# Patient Record
Sex: FEMALE | Race: OTHER | NOT HISPANIC OR LATINO | URBAN - METROPOLITAN AREA
[De-identification: names, ages, dates, MRNs, and addresses within clinical notes are randomized per-mention and may not be internally consistent; named-entity substitution may affect disease eponyms.]

---

## 2018-06-01 ENCOUNTER — EMERGENCY (EMERGENCY)
Facility: HOSPITAL | Age: 48
LOS: 1 days | Discharge: HOME | End: 2018-06-01
Attending: EMERGENCY MEDICINE
Payer: COMMERCIAL

## 2018-06-01 VITALS
SYSTOLIC BLOOD PRESSURE: 91 MMHG | OXYGEN SATURATION: 99 % | RESPIRATION RATE: 18 BRPM | HEART RATE: 68 BPM | DIASTOLIC BLOOD PRESSURE: 58 MMHG | TEMPERATURE: 99 F

## 2018-06-01 VITALS
WEIGHT: 108.03 LBS | DIASTOLIC BLOOD PRESSURE: 64 MMHG | SYSTOLIC BLOOD PRESSURE: 93 MMHG | RESPIRATION RATE: 16 BRPM | HEART RATE: 94 BPM | OXYGEN SATURATION: 100 % | TEMPERATURE: 100 F

## 2018-06-01 LAB
ALBUMIN SERPL ELPH-MCNC: 4.4 G/DL — SIGNIFICANT CHANGE UP (ref 3.3–5)
ALP SERPL-CCNC: 53 U/L — SIGNIFICANT CHANGE UP (ref 40–120)
ALT FLD-CCNC: 22 U/L — SIGNIFICANT CHANGE UP (ref 10–45)
ANION GAP SERPL CALC-SCNC: 11 MMOL/L — SIGNIFICANT CHANGE UP (ref 5–17)
ANION GAP SERPL CALC-SCNC: 14 MMOL/L — SIGNIFICANT CHANGE UP (ref 5–17)
APPEARANCE UR: ABNORMAL
AST SERPL-CCNC: 14 U/L — SIGNIFICANT CHANGE UP (ref 10–40)
BILIRUB SERPL-MCNC: 0.5 MG/DL — SIGNIFICANT CHANGE UP (ref 0.2–1.2)
BILIRUB UR-MCNC: NEGATIVE — SIGNIFICANT CHANGE UP
BUN SERPL-MCNC: 10 MG/DL — SIGNIFICANT CHANGE UP (ref 7–23)
BUN SERPL-MCNC: 8 MG/DL — SIGNIFICANT CHANGE UP (ref 7–23)
CALCIUM SERPL-MCNC: 8 MG/DL — LOW (ref 8.4–10.5)
CALCIUM SERPL-MCNC: 9.2 MG/DL — SIGNIFICANT CHANGE UP (ref 8.4–10.5)
CHLORIDE SERPL-SCNC: 91 MMOL/L — LOW (ref 96–108)
CHLORIDE SERPL-SCNC: 99 MMOL/L — SIGNIFICANT CHANGE UP (ref 96–108)
CO2 SERPL-SCNC: 21 MMOL/L — LOW (ref 22–31)
CO2 SERPL-SCNC: 23 MMOL/L — SIGNIFICANT CHANGE UP (ref 22–31)
COLOR SPEC: YELLOW — SIGNIFICANT CHANGE UP
CREAT SERPL-MCNC: 0.73 MG/DL — SIGNIFICANT CHANGE UP (ref 0.5–1.3)
CREAT SERPL-MCNC: 0.78 MG/DL — SIGNIFICANT CHANGE UP (ref 0.5–1.3)
DIFF PNL FLD: ABNORMAL
GAS PNL BLDV: SIGNIFICANT CHANGE UP
GLUCOSE SERPL-MCNC: 109 MG/DL — HIGH (ref 70–99)
GLUCOSE SERPL-MCNC: 138 MG/DL — HIGH (ref 70–99)
GLUCOSE UR QL: NEGATIVE — SIGNIFICANT CHANGE UP
HCT VFR BLD CALC: 38 % — SIGNIFICANT CHANGE UP (ref 34.5–45)
HGB BLD-MCNC: 13.1 G/DL — SIGNIFICANT CHANGE UP (ref 11.5–15.5)
KETONES UR-MCNC: ABNORMAL
LEUKOCYTE ESTERASE UR-ACNC: ABNORMAL
MCHC RBC-ENTMCNC: 31.4 PG — SIGNIFICANT CHANGE UP (ref 27–34)
MCHC RBC-ENTMCNC: 34.4 GM/DL — SIGNIFICANT CHANGE UP (ref 32–36)
MCV RBC AUTO: 91.2 FL — SIGNIFICANT CHANGE UP (ref 80–100)
NITRITE UR-MCNC: NEGATIVE — SIGNIFICANT CHANGE UP
PH UR: 7 — SIGNIFICANT CHANGE UP (ref 5–8)
PLATELET # BLD AUTO: 180 K/UL — SIGNIFICANT CHANGE UP (ref 150–400)
POTASSIUM SERPL-MCNC: 3.6 MMOL/L — SIGNIFICANT CHANGE UP (ref 3.5–5.3)
POTASSIUM SERPL-MCNC: 3.8 MMOL/L — SIGNIFICANT CHANGE UP (ref 3.5–5.3)
POTASSIUM SERPL-SCNC: 3.6 MMOL/L — SIGNIFICANT CHANGE UP (ref 3.5–5.3)
POTASSIUM SERPL-SCNC: 3.8 MMOL/L — SIGNIFICANT CHANGE UP (ref 3.5–5.3)
PROT SERPL-MCNC: 8.3 G/DL — SIGNIFICANT CHANGE UP (ref 6–8.3)
PROT UR-MCNC: 300 MG/DL
RBC # BLD: 4.16 M/UL — SIGNIFICANT CHANGE UP (ref 3.8–5.2)
RBC # FLD: 12 % — SIGNIFICANT CHANGE UP (ref 10.3–14.5)
SODIUM SERPL-SCNC: 128 MMOL/L — LOW (ref 135–145)
SODIUM SERPL-SCNC: 131 MMOL/L — LOW (ref 135–145)
SP GR SPEC: >1.03 — HIGH (ref 1.01–1.02)
UROBILINOGEN FLD QL: 2 MG/DL
WBC # BLD: 12.1 K/UL — HIGH (ref 3.8–10.5)
WBC # FLD AUTO: 12.1 K/UL — HIGH (ref 3.8–10.5)

## 2018-06-01 PROCEDURE — 83605 ASSAY OF LACTIC ACID: CPT

## 2018-06-01 PROCEDURE — 82330 ASSAY OF CALCIUM: CPT

## 2018-06-01 PROCEDURE — 84132 ASSAY OF SERUM POTASSIUM: CPT

## 2018-06-01 PROCEDURE — 99284 EMERGENCY DEPT VISIT MOD MDM: CPT

## 2018-06-01 PROCEDURE — 71045 X-RAY EXAM CHEST 1 VIEW: CPT | Mod: 26

## 2018-06-01 PROCEDURE — 81001 URINALYSIS AUTO W/SCOPE: CPT

## 2018-06-01 PROCEDURE — 85027 COMPLETE CBC AUTOMATED: CPT

## 2018-06-01 PROCEDURE — 82803 BLOOD GASES ANY COMBINATION: CPT

## 2018-06-01 PROCEDURE — 80053 COMPREHEN METABOLIC PANEL: CPT

## 2018-06-01 PROCEDURE — 80048 BASIC METABOLIC PNL TOTAL CA: CPT

## 2018-06-01 PROCEDURE — 82947 ASSAY GLUCOSE BLOOD QUANT: CPT

## 2018-06-01 PROCEDURE — 74177 CT ABD & PELVIS W/CONTRAST: CPT

## 2018-06-01 PROCEDURE — 82435 ASSAY OF BLOOD CHLORIDE: CPT

## 2018-06-01 PROCEDURE — 84295 ASSAY OF SERUM SODIUM: CPT

## 2018-06-01 PROCEDURE — 71045 X-RAY EXAM CHEST 1 VIEW: CPT

## 2018-06-01 PROCEDURE — 85014 HEMATOCRIT: CPT

## 2018-06-01 PROCEDURE — 83690 ASSAY OF LIPASE: CPT

## 2018-06-01 PROCEDURE — 74177 CT ABD & PELVIS W/CONTRAST: CPT | Mod: 26

## 2018-06-01 RX ORDER — SODIUM CHLORIDE 9 MG/ML
1000 INJECTION INTRAMUSCULAR; INTRAVENOUS; SUBCUTANEOUS ONCE
Qty: 0 | Refills: 0 | Status: COMPLETED | OUTPATIENT
Start: 2018-06-01 | End: 2018-06-01

## 2018-06-01 RX ORDER — IBUPROFEN 200 MG
600 TABLET ORAL ONCE
Qty: 0 | Refills: 0 | Status: COMPLETED | OUTPATIENT
Start: 2018-06-01 | End: 2018-06-01

## 2018-06-01 RX ORDER — IBUPROFEN 200 MG
1 TABLET ORAL
Qty: 20 | Refills: 0 | OUTPATIENT
Start: 2018-06-01 | End: 2018-06-05

## 2018-06-01 RX ORDER — ACETAMINOPHEN 500 MG
650 TABLET ORAL ONCE
Qty: 0 | Refills: 0 | Status: COMPLETED | OUTPATIENT
Start: 2018-06-01 | End: 2018-06-01

## 2018-06-01 RX ADMIN — SODIUM CHLORIDE 1000 MILLILITER(S): 9 INJECTION INTRAMUSCULAR; INTRAVENOUS; SUBCUTANEOUS at 15:24

## 2018-06-01 RX ADMIN — Medication 600 MILLIGRAM(S): at 12:41

## 2018-06-01 RX ADMIN — SODIUM CHLORIDE 1000 MILLILITER(S): 9 INJECTION INTRAMUSCULAR; INTRAVENOUS; SUBCUTANEOUS at 12:15

## 2018-06-01 RX ADMIN — Medication 600 MILLIGRAM(S): at 13:05

## 2018-06-01 NOTE — ED PROVIDER NOTE - PROGRESS NOTE DETAILS
- 48 year old female chasity get labs and ct abdomen to r/o infection vs other pathological causes of pain; will give tylenol for fever and pain; ivf normal saline; will reassess patient after - reviewed patient labs and radiology reports with patient and family; spoke to patient pmd Dr Nguyen about patient labs and radiology as well; given hyponatremia will give one more liter of normal saline and recheck patient bmp; patient can follow up with pcp tomorrow if labs wnl; reviewed patient vitals from outpatient office patient genrally runs low per family and pcp record april was 102/63 sodium improved to 131; will dc patient to home to follow up with pmd in am; dc instructions and labs reviewed with patient and daughter; rx for ibuprofen sent to pharmacy

## 2018-06-01 NOTE — ED ADULT NURSE NOTE - OBJECTIVE STATEMENT
48y female c/o abdominal pain. A&Ox3 and febrile. Chinese speaking with daughter at bedside translating, denies  service. Pt reports 48y female c/o abdominal pain. A&Ox3 and febrile. Chinese speaking with daughter at bedside translating, denies  service. Denies medical hx. Recent travel to [place]. Pt reports RUQ abdominal pain x3 weeks, worsening over the last 5 days. 2 weeks ago, US performed dx cyst on gallbladder. Pt reports worsening RUQ pain x5 days with intermittent fevers, fever max 104F. Last dose tylenol at 9am today. Visited PMD yesterday who prescribed cipro. Pt also reports burning upon urination and decreased urinary output x5 days. Pt states blood noted in toilet after urinating 2 days ago but unsure if the blood was from the urine or vagina.  Pt post-menopausal x4 years. Denies pain, sob, n/v/d. Abdomen soft, tender in RUQ and suprapubic, + bowel sounds. Lungs CTA. 48y female c/o abdominal pain. A&Ox3 and febrile. Chinese speaking with daughter at bedside translating, denies  service. Denies medical hx. Recent travel to [place]. Pt reports RUQ abdominal pain x3 weeks, worsening over the last 5 days. 2 weeks ago, US performed dx cyst on gallbladder. Pt reports worsening RUQ pain x5 days with intermittent fevers, fever max 104F. Last dose tylenol at 9am today. Visited PMD yesterday who prescribed cipro. Pt also reports burning upon urination and decreased urinary output x5 days. Pt states blood noted in toilet after urinating 2 days ago but unsure if the blood was from the urine or vagina.  Pt post-menopausal x4 years. Daughter also reports pt coloring has become more jaundiced. Denies pain, sob, n/v/d. Abdomen soft, tender in RUQ and suprapubic, + bowel sounds. Lungs CTA.

## 2018-06-01 NOTE — ED PROVIDER NOTE - ATTENDING CONTRIBUTION TO CARE
I performed a history and physical exam of the patient and discussed their management with the resident and /or advanced care provider. I reviewed the resident and /or ACP's note and agree with the documented findings and plan of care. My medical decision making and observations are found above.  Abd soft, lungs clear.

## 2018-06-01 NOTE — ED PROVIDER NOTE - CARE PLAN
Principal Discharge DX:	Abdominal pain  Goal:	to not have further pain  Assessment and plan of treatment:	- take medications as prescribed  - follow up with pmd

## 2018-06-01 NOTE — ED PROVIDER NOTE - OBJECTIVE STATEMENT
48 year old female coming in with complaints of worsening abdominal pain. patient stating having abdominal pain for past 2 weeks with worsening for past 5 days. states pain located mainly in the ruq area is non radiating. [facility] had us done outpatient which showed gallbladder cysts. patient also stating travelled to [place] few weeks back. [facility] has been having intermittent fevers for past 5 days with tmax found to be 104 orally by patient. [facility] takes tylenol and fever decreases. patient states followed with pmd and was started on cipro yesterday. also stating having burning on urination x 5 days. pain described as aching 8/10 non radiating    denies cp sob nausea vomiting diarrhea 48 year old female coming in with complaints of worsening abdominal pain. patient stating having abdominal pain for past 2 weeks with worsening for past 5 days. states pain located mainly in the ruq area is non radiating. [facility] had us done outpatient which showed gallbladder cysts. patient also stating travelled to [place] few weeks back. [facility] has been having intermittent fevers for past 5 days with tmax found to be 104 orally by patient. [facility] takes tylenol and fever decreases. patient states followed with pmd and was started on cipro yesterday. also stating having burning on urination x 5 days. pain described as aching 8/10 non radiating. spoke to patient pmd who states started medication for uti/pyelo    denies cp sob nausea vomiting diarrhea

## 2018-06-01 NOTE — ED PROVIDER NOTE - MEDICAL DECISION MAKING DETAILS
Maryan: fever and abd pian for 5 days. multiple other symptoms for up to 2 years before.  discussed with primary.  Ct ardered to assess liver/gallbladder, look for other causes of infection. Last in [place] May4th.

## 2018-06-01 NOTE — ED PROVIDER NOTE - CONDUCTED A DETAILED DISCUSSION WITH PATIENT AND/OR GUARDIAN REGARDING, MDM
radiology results/lab results return to ED if symptoms worsen, persist or questions arise/radiology results/lab results

## 2020-06-03 NOTE — ED ADULT NURSE NOTE - NS ED NURSE LEVEL OF CONSCIOUSNESS SPEECH
Health Maintenance Due   Topic Date Due   • DTaP/Tdap/Td Vaccine (1 - Tdap) 11/23/1949   • Hepatitis B Vaccine (1 of 3 - Risk 3-dose series) 11/23/1957   • Shingles Vaccine (1 of 2) 11/23/1986   • Osteoporosis Screening  11/23/2001   • Medicare Wellness 65+  10/26/2018   • Diabetes Foot Exam  03/07/2020   • Diabetes A1C  05/14/2020       Patient is due for topics as listed above but is not proceeding with Immunization(s) Dtap/Tdap/Td and Shingles at this time.    Not covered by insurance.           Speaking Coherently